# Patient Record
Sex: MALE | Race: BLACK OR AFRICAN AMERICAN | NOT HISPANIC OR LATINO | Employment: FULL TIME | URBAN - METROPOLITAN AREA
[De-identification: names, ages, dates, MRNs, and addresses within clinical notes are randomized per-mention and may not be internally consistent; named-entity substitution may affect disease eponyms.]

---

## 2023-12-18 ENCOUNTER — LAB (OUTPATIENT)
Dept: LAB | Facility: LAB | Age: 68
End: 2023-12-18
Payer: COMMERCIAL

## 2023-12-18 ENCOUNTER — OFFICE VISIT (OUTPATIENT)
Dept: PRIMARY CARE | Facility: CLINIC | Age: 68
End: 2023-12-18
Payer: COMMERCIAL

## 2023-12-18 VITALS
SYSTOLIC BLOOD PRESSURE: 180 MMHG | BODY MASS INDEX: 35.49 KG/M2 | WEIGHT: 213 LBS | OXYGEN SATURATION: 92 % | DIASTOLIC BLOOD PRESSURE: 114 MMHG | HEART RATE: 78 BPM | HEIGHT: 65 IN

## 2023-12-18 DIAGNOSIS — Z12.5 SCREENING FOR PROSTATE CANCER: ICD-10-CM

## 2023-12-18 DIAGNOSIS — I10 HYPERTENSION, UNSPECIFIED TYPE: Primary | ICD-10-CM

## 2023-12-18 DIAGNOSIS — E55.9 VITAMIN D DEFICIENCY: ICD-10-CM

## 2023-12-18 DIAGNOSIS — K21.9 GASTROESOPHAGEAL REFLUX DISEASE, UNSPECIFIED WHETHER ESOPHAGITIS PRESENT: ICD-10-CM

## 2023-12-18 DIAGNOSIS — R05.8 PRODUCTIVE COUGH: ICD-10-CM

## 2023-12-18 DIAGNOSIS — Z12.11 SCREENING FOR COLON CANCER: ICD-10-CM

## 2023-12-18 DIAGNOSIS — Z00.00 WELLNESS EXAMINATION: ICD-10-CM

## 2023-12-18 DIAGNOSIS — I10 HYPERTENSION, UNSPECIFIED TYPE: ICD-10-CM

## 2023-12-18 PROBLEM — R13.14 PHARYNGOESOPHAGEAL DYSPHAGIA: Status: ACTIVE | Noted: 2023-12-18

## 2023-12-18 PROBLEM — R10.12 LEFT UPPER QUADRANT PAIN: Status: ACTIVE | Noted: 2023-12-18

## 2023-12-18 PROBLEM — J06.9 VIRAL URI WITH COUGH: Status: ACTIVE | Noted: 2023-12-18

## 2023-12-18 PROBLEM — J02.9 PHARYNGITIS: Status: ACTIVE | Noted: 2023-12-18

## 2023-12-18 PROBLEM — A04.8 H. PYLORI INFECTION: Status: ACTIVE | Noted: 2023-12-18

## 2023-12-18 PROBLEM — K29.70 GASTRITIS: Status: ACTIVE | Noted: 2023-12-18

## 2023-12-18 PROBLEM — R76.8 ANA POSITIVE: Status: ACTIVE | Noted: 2023-12-18

## 2023-12-18 PROBLEM — R10.9 ABDOMINAL PAIN: Status: ACTIVE | Noted: 2023-12-18

## 2023-12-18 PROBLEM — R79.9 ABNORMAL BLOOD CHEMISTRY: Status: ACTIVE | Noted: 2023-12-18

## 2023-12-18 PROBLEM — J02.9 SORE THROAT: Status: ACTIVE | Noted: 2023-12-18

## 2023-12-18 PROBLEM — M65.312 TRIGGER THUMB OF LEFT HAND: Status: ACTIVE | Noted: 2023-12-18

## 2023-12-18 PROBLEM — D49.0 CECAL NEOPLASM: Status: ACTIVE | Noted: 2023-12-18

## 2023-12-18 PROBLEM — J31.2 CHRONIC SORE THROAT: Status: ACTIVE | Noted: 2023-12-18

## 2023-12-18 PROBLEM — G47.33 OBSTRUCTIVE SLEEP APNEA OF ADULT: Status: ACTIVE | Noted: 2023-12-18

## 2023-12-18 PROBLEM — M79.643 PAIN IN HAND: Status: ACTIVE | Noted: 2023-12-18

## 2023-12-18 PROBLEM — J01.90 ACUTE SINUSITIS: Status: ACTIVE | Noted: 2023-12-18

## 2023-12-18 PROBLEM — M17.11 OSTEOARTHRITIS OF RIGHT PATELLOFEMORAL JOINT: Status: ACTIVE | Noted: 2023-12-18

## 2023-12-18 PROBLEM — M54.16 LUMBAR RADICULOPATHY: Status: ACTIVE | Noted: 2023-12-18

## 2023-12-18 PROBLEM — J32.9 SINUSITIS: Status: ACTIVE | Noted: 2023-12-18

## 2023-12-18 PROBLEM — M25.40 JOINT EFFUSION: Status: ACTIVE | Noted: 2023-12-18

## 2023-12-18 PROBLEM — M19.90 ARTHRITIS: Status: ACTIVE | Noted: 2023-12-18

## 2023-12-18 PROBLEM — M65.341 TRIGGER RING FINGER OF RIGHT HAND: Status: ACTIVE | Noted: 2023-12-18

## 2023-12-18 PROBLEM — M25.569 KNEE PAIN: Status: ACTIVE | Noted: 2023-12-18

## 2023-12-18 LAB
25(OH)D3 SERPL-MCNC: 24 NG/ML (ref 30–100)
ALBUMIN SERPL BCP-MCNC: 4.6 G/DL (ref 3.4–5)
ALP SERPL-CCNC: 65 U/L (ref 33–136)
ALT SERPL W P-5'-P-CCNC: 20 U/L (ref 10–52)
ANION GAP SERPL CALC-SCNC: 14 MMOL/L (ref 10–20)
AST SERPL W P-5'-P-CCNC: 24 U/L (ref 9–39)
BILIRUB SERPL-MCNC: 0.6 MG/DL (ref 0–1.2)
BUN SERPL-MCNC: 11 MG/DL (ref 6–23)
CALCIUM SERPL-MCNC: 10.2 MG/DL (ref 8.6–10.6)
CHLORIDE SERPL-SCNC: 105 MMOL/L (ref 98–107)
CHOLEST SERPL-MCNC: 171 MG/DL (ref 0–199)
CHOLESTEROL/HDL RATIO: 3.8
CO2 SERPL-SCNC: 28 MMOL/L (ref 21–32)
CREAT SERPL-MCNC: 0.72 MG/DL (ref 0.5–1.3)
ERYTHROCYTE [DISTWIDTH] IN BLOOD BY AUTOMATED COUNT: 14.5 % (ref 11.5–14.5)
GFR SERPL CREATININE-BSD FRML MDRD: >90 ML/MIN/1.73M*2
GLUCOSE SERPL-MCNC: 83 MG/DL (ref 74–99)
HCT VFR BLD AUTO: 48 % (ref 41–52)
HDLC SERPL-MCNC: 45.2 MG/DL
HGB BLD-MCNC: 15.6 G/DL (ref 13.5–17.5)
LDLC SERPL CALC-MCNC: 99 MG/DL
MCH RBC QN AUTO: 27.3 PG (ref 26–34)
MCHC RBC AUTO-ENTMCNC: 32.5 G/DL (ref 32–36)
MCV RBC AUTO: 84 FL (ref 80–100)
NON HDL CHOLESTEROL: 126 MG/DL (ref 0–149)
NRBC BLD-RTO: 0 /100 WBCS (ref 0–0)
PLATELET # BLD AUTO: 177 X10*3/UL (ref 150–450)
POTASSIUM SERPL-SCNC: 4.1 MMOL/L (ref 3.5–5.3)
PROT SERPL-MCNC: 7.9 G/DL (ref 6.4–8.2)
PSA SERPL-MCNC: 0.8 NG/ML
RBC # BLD AUTO: 5.72 X10*6/UL (ref 4.5–5.9)
SODIUM SERPL-SCNC: 143 MMOL/L (ref 136–145)
TRIGL SERPL-MCNC: 136 MG/DL (ref 0–149)
TSH SERPL-ACNC: 1.97 MIU/L (ref 0.44–3.98)
VLDL: 27 MG/DL (ref 0–40)
WBC # BLD AUTO: 4.3 X10*3/UL (ref 4.4–11.3)

## 2023-12-18 PROCEDURE — 3080F DIAST BP >= 90 MM HG: CPT | Performed by: STUDENT IN AN ORGANIZED HEALTH CARE EDUCATION/TRAINING PROGRAM

## 2023-12-18 PROCEDURE — 1159F MED LIST DOCD IN RCRD: CPT | Performed by: STUDENT IN AN ORGANIZED HEALTH CARE EDUCATION/TRAINING PROGRAM

## 2023-12-18 PROCEDURE — 36415 COLL VENOUS BLD VENIPUNCTURE: CPT

## 2023-12-18 PROCEDURE — 84443 ASSAY THYROID STIM HORMONE: CPT

## 2023-12-18 PROCEDURE — 85027 COMPLETE CBC AUTOMATED: CPT

## 2023-12-18 PROCEDURE — 1160F RVW MEDS BY RX/DR IN RCRD: CPT | Performed by: STUDENT IN AN ORGANIZED HEALTH CARE EDUCATION/TRAINING PROGRAM

## 2023-12-18 PROCEDURE — 80061 LIPID PANEL: CPT

## 2023-12-18 PROCEDURE — 84153 ASSAY OF PSA TOTAL: CPT

## 2023-12-18 PROCEDURE — 3077F SYST BP >= 140 MM HG: CPT | Performed by: STUDENT IN AN ORGANIZED HEALTH CARE EDUCATION/TRAINING PROGRAM

## 2023-12-18 PROCEDURE — 1036F TOBACCO NON-USER: CPT | Performed by: STUDENT IN AN ORGANIZED HEALTH CARE EDUCATION/TRAINING PROGRAM

## 2023-12-18 PROCEDURE — 82306 VITAMIN D 25 HYDROXY: CPT

## 2023-12-18 PROCEDURE — 93000 ELECTROCARDIOGRAM COMPLETE: CPT | Performed by: STUDENT IN AN ORGANIZED HEALTH CARE EDUCATION/TRAINING PROGRAM

## 2023-12-18 PROCEDURE — 99214 OFFICE O/P EST MOD 30 MIN: CPT | Performed by: STUDENT IN AN ORGANIZED HEALTH CARE EDUCATION/TRAINING PROGRAM

## 2023-12-18 PROCEDURE — 99397 PER PM REEVAL EST PAT 65+ YR: CPT | Performed by: STUDENT IN AN ORGANIZED HEALTH CARE EDUCATION/TRAINING PROGRAM

## 2023-12-18 PROCEDURE — 80053 COMPREHEN METABOLIC PANEL: CPT

## 2023-12-18 RX ORDER — AMOXICILLIN AND CLAVULANATE POTASSIUM 875; 125 MG/1; MG/1
875 TABLET, FILM COATED ORAL 2 TIMES DAILY
Qty: 20 TABLET | Refills: 0 | Status: SHIPPED | OUTPATIENT
Start: 2023-12-18 | End: 2023-12-28

## 2023-12-18 RX ORDER — PANTOPRAZOLE SODIUM 40 MG/1
40 TABLET, DELAYED RELEASE ORAL DAILY
Qty: 30 TABLET | Refills: 5 | Status: SHIPPED | OUTPATIENT
Start: 2023-12-18 | End: 2024-06-15

## 2023-12-18 RX ORDER — OMEPRAZOLE 40 MG/1
1 CAPSULE, DELAYED RELEASE ORAL DAILY
COMMUNITY
Start: 2022-03-29 | End: 2023-12-18 | Stop reason: ALTCHOICE

## 2023-12-18 RX ORDER — AMLODIPINE AND OLMESARTAN MEDOXOMIL 5; 20 MG/1; MG/1
1 TABLET ORAL DAILY
Qty: 30 TABLET | Refills: 1 | Status: SHIPPED | OUTPATIENT
Start: 2023-12-18 | End: 2024-02-16

## 2023-12-18 ASSESSMENT — PATIENT HEALTH QUESTIONNAIRE - PHQ9
1. LITTLE INTEREST OR PLEASURE IN DOING THINGS: NOT AT ALL
SUM OF ALL RESPONSES TO PHQ9 QUESTIONS 1 AND 2: 0
2. FEELING DOWN, DEPRESSED OR HOPELESS: NOT AT ALL

## 2023-12-18 ASSESSMENT — COLUMBIA-SUICIDE SEVERITY RATING SCALE - C-SSRS
6. HAVE YOU EVER DONE ANYTHING, STARTED TO DO ANYTHING, OR PREPARED TO DO ANYTHING TO END YOUR LIFE?: NO
2. HAVE YOU ACTUALLY HAD ANY THOUGHTS OF KILLING YOURSELF?: NO
1. IN THE PAST MONTH, HAVE YOU WISHED YOU WERE DEAD OR WISHED YOU COULD GO TO SLEEP AND NOT WAKE UP?: NO

## 2023-12-18 ASSESSMENT — ENCOUNTER SYMPTOMS
LOSS OF SENSATION IN FEET: 0
OCCASIONAL FEELINGS OF UNSTEADINESS: 0
DEPRESSION: 0

## 2023-12-18 NOTE — PROGRESS NOTES
HPI: 68-year-old male presenting for follow-up on multiple concerns, CPE.    HTN  Not historically on blood pressure medication.  Not checking at home frequently, states he has checked at the drugstore randomly, systolic usually about 160.  Denies any chest pain or shortness of breath.    GERD  Was on PPI at last visit about 2 years ago, has not been taking for over a year.  Getting sore throats in the morning.    Productive cough  Has been sick for about 2 weeks, has had productive cough for about the last week, lots of green phlegm.  Never smoker.    SocHx:   - Smoking: Never    12 point ROS reviewed and negative other than as stated in HPI      General: Alert, oriented, pleasant, in no acute distress  HEENT:      Head: normocephalic, atraumatic;      eyes: EOMI, no scleral icterus;   Neck: soft, supple, non-tender, no masses appreciated  CV: Heart with regular rate and rhythm, normal S1/S2, no murmurs  Lungs: CTAB without wheezing, rhonchi or rales; good respiratory effort, no increased work of breathing  Abdomen: soft, non-tender, non-distended, no masses appreciated  Extremities: no edema, no cyanosis  Neuro: Cranial nerves grossly intact; alert and oriented, normal gait  Psych: Appropriate mood and affect    #HM  -CBC, CMP, Lipid panel, Vit D, TSH with reflex T4, PSA  -Vaccines:       Flu: declines      Shingrix: Recommended, advised to go to local pharmacy      Pneumococcal: Declines      Tdap: Recommended, advised to go to local pharmacy  -Lung cancer screening with low-dose CT: Never smoker  -Colonoscopy: 2014, 5 year cuco, ordered  -AAA screening: Never smoker    #Hypertension  -EKG without significant findings in office, urine analysis without any protein, asymptomatic  -Will start amlodipine-olmesartan 5-20 mg daily  -BMP in 2 weeks  -Home logs given, goal of 130/80, patient to call if any issues with medication or not meeting goal.    #GERD  -Start pantoprazole 40 mg daily    #Productive cough  - Given  duration, will give Augmentin 875 mg twice daily    F/U 4 weeks, sooner if needed    Chris D'Amico, DO

## 2023-12-19 NOTE — RESULT ENCOUNTER NOTE
Vitamin D borderline low at 24, recommend OTC vitamin D3, 2000 units daily.    Remaining labs essentially unremarkable.

## 2024-01-08 ENCOUNTER — APPOINTMENT (OUTPATIENT)
Dept: PRIMARY CARE | Facility: CLINIC | Age: 69
End: 2024-01-08
Payer: COMMERCIAL

## 2024-09-26 ENCOUNTER — OFFICE VISIT (OUTPATIENT)
Dept: URGENT CARE | Age: 69
End: 2024-09-26
Payer: COMMERCIAL

## 2024-09-26 VITALS
DIASTOLIC BLOOD PRESSURE: 86 MMHG | HEART RATE: 76 BPM | SYSTOLIC BLOOD PRESSURE: 119 MMHG | RESPIRATION RATE: 18 BRPM | BODY MASS INDEX: 31.39 KG/M2 | WEIGHT: 200 LBS | HEIGHT: 67 IN | OXYGEN SATURATION: 96 % | TEMPERATURE: 97.5 F

## 2024-09-26 DIAGNOSIS — R05.9 COUGH, UNSPECIFIED TYPE: ICD-10-CM

## 2024-09-26 DIAGNOSIS — J40 BRONCHITIS: Primary | ICD-10-CM

## 2024-09-26 LAB — POC SARS-COV-2 AG BINAX: NORMAL

## 2024-09-26 RX ORDER — METHYLPREDNISOLONE 4 MG/1
TABLET ORAL
Qty: 21 TABLET | Refills: 0 | Status: SHIPPED | OUTPATIENT
Start: 2024-09-26 | End: 2024-10-03

## 2024-09-26 RX ORDER — AZITHROMYCIN 250 MG/1
TABLET, FILM COATED ORAL
Qty: 6 TABLET | Refills: 0 | Status: SHIPPED | OUTPATIENT
Start: 2024-09-26 | End: 2024-10-01

## 2024-09-26 RX ORDER — ALBUTEROL SULFATE 90 UG/1
2 INHALANT RESPIRATORY (INHALATION) EVERY 4 HOURS PRN
Qty: 8.5 G | Refills: 0 | Status: SHIPPED | OUTPATIENT
Start: 2024-09-26 | End: 2025-09-26

## 2024-09-26 ASSESSMENT — ENCOUNTER SYMPTOMS
SORE THROAT: 1
COUGH: 1

## 2024-09-26 NOTE — PATIENT INSTRUCTIONS
You were seen at Urgent Care today for ongoing cough.  Please treat as discussed. Please take medications as prescribed. Monitor for red flags which we spoke about, If your symptoms change, worsen or become concerning in any way, please go to the emergency room immediately, otherwise you can followup with your PCP in 2-3 days as needed

## 2024-09-26 NOTE — PROGRESS NOTES
"Subjective   Patient ID: Pepito Maxwell Jr. is a 69 y.o. male. They present today with a chief complaint of Cough (Dizzy, sweats).    History of Present Illness  Patient is a 69-year-old male with history of chronic bronchitis who presents to urgent care today with a complaint of cough, congestion and sore throat x 2 weeks.  He is here with his wife who states the patient gets bronchitis almost every year but \"this is a little early for him\".  He endorses a productive cough but sometimes keeps him awake at night.  He denies any chest pain or shortness of breath.  No other complaints or concerns mention at this time.      History provided by:  Patient  Cough  Associated symptoms include a sore throat.       Past Medical History  Allergies as of 09/26/2024    (No Known Allergies)       (Not in a hospital admission)         Past Medical History:   Diagnosis Date    Personal history of other diseases of the digestive system 08/29/2014    History of gastritis       Past Surgical History:   Procedure Laterality Date    OTHER SURGICAL HISTORY  03/29/2022    No history of surgery        reports that he has never smoked. He has never used smokeless tobacco.    Review of Systems  Review of Systems   HENT:  Positive for sore throat.    Respiratory:  Positive for cough.                                   Objective    Vitals:    09/26/24 1743   BP: 119/86   Pulse: 76   Resp: 18   Temp: 36.4 °C (97.5 °F)   SpO2: 96%   Weight: 90.7 kg (200 lb)   Height: 1.702 m (5' 7\")     No LMP for male patient.    Physical Exam  Vitals and nursing note reviewed.   Constitutional:       General: He is not in acute distress.     Appearance: Normal appearance. He is not ill-appearing, toxic-appearing or diaphoretic.   HENT:      Head: Normocephalic and atraumatic.      Nose:      Right Sinus: No maxillary sinus tenderness or frontal sinus tenderness.      Left Sinus: No maxillary sinus tenderness or frontal sinus tenderness.      Mouth/Throat:     "  Mouth: Mucous membranes are moist.      Pharynx: Posterior oropharyngeal erythema and postnasal drip present. No pharyngeal swelling or oropharyngeal exudate.   Eyes:      Extraocular Movements: Extraocular movements intact.      Conjunctiva/sclera: Conjunctivae normal.      Pupils: Pupils are equal, round, and reactive to light.   Cardiovascular:      Rate and Rhythm: Normal rate and regular rhythm.      Pulses: Normal pulses.      Heart sounds: Normal heart sounds.   Pulmonary:      Effort: Pulmonary effort is normal. No respiratory distress.      Breath sounds: No stridor. Wheezing present. No rhonchi or rales.   Chest:      Chest wall: No tenderness.   Musculoskeletal:         General: Normal range of motion.      Cervical back: Normal range of motion and neck supple.   Skin:     General: Skin is warm and dry.      Capillary Refill: Capillary refill takes less than 2 seconds.   Neurological:      General: No focal deficit present.      Mental Status: He is alert and oriented to person, place, and time.   Psychiatric:         Mood and Affect: Mood normal.         Behavior: Behavior normal.         Procedures      Assessment/Plan   Allergies, medications, history, and pertinent labs/EKGs/Imaging reviewed by KATE Soto.     Medical Decision Making  Patient is well appearing, afebrile, non toxic, not hypoxic, and appropriate for outpatient treatment and management at time of evaluation. Patient presents with ongoing cough and sore throat x 2 weeks. Differential includes but not limited to: Bronchitis, COVID, URI, other.  Rapid COVID is negative.  History and physical exam consistent with bronchitis.  Prescription for Medrol Dosepak, albuterol inhaler and azithromycin called into patient's pharmacy use as directed.  Close follow-up with PCP.      Plan: Discussed differential with the patient. Patient voices understanding and is agreeable to close follow-up with their PCP in the next 2-3 days. They  understand they should go to the emergency room immediately for any new, worsening or concerning symptoms. Patient understands return precautions and discharge instructions.      Orders and Diagnoses  Diagnoses and all orders for this visit:  Bronchitis  -     albuterol (ProAir HFA) 90 mcg/actuation inhaler; Inhale 2 puffs every 4 hours if needed for wheezing or shortness of breath.  -     azithromycin (Zithromax) 250 mg tablet; Take 2 tabs (500 mg) by mouth today, than 1 daily for 4 days.  -     methylPREDNISolone (Medrol Dospak) 4 mg tablets; Take as directed on package.  Cough, unspecified type  -     POCT Covid-19 Rapid Antigen      Medical Admin Record      Follow Up Instructions  No follow-ups on file.    Patient disposition: Home    Electronically signed by KATE Soto  6:25 PM

## 2025-02-28 ENCOUNTER — OFFICE VISIT (OUTPATIENT)
Dept: URGENT CARE | Age: 70
End: 2025-02-28
Payer: COMMERCIAL

## 2025-02-28 VITALS
TEMPERATURE: 98.2 F | BODY MASS INDEX: 31.39 KG/M2 | SYSTOLIC BLOOD PRESSURE: 153 MMHG | HEIGHT: 67 IN | DIASTOLIC BLOOD PRESSURE: 100 MMHG | WEIGHT: 200 LBS | HEART RATE: 78 BPM | OXYGEN SATURATION: 94 %

## 2025-02-28 DIAGNOSIS — Z75.8 DOES NOT HAVE PRIMARY CARE PROVIDER: Primary | ICD-10-CM

## 2025-02-28 NOTE — PROGRESS NOTES
"Subjective   Patient ID: Pepito Maxwell Jr. is a 69 y.o. male. They present today with a chief complaint of Other (Pt states x2weeks lft shoulder says has a lump on top of shoulder ).    History of Present Illness  Patient is a very pleasant 69-year-old male with history of gastritis who presents urgent care today with his wife for complaint of lump on his left shoulder.  He states he first noticed it 2 weeks ago.  He denies any trauma or injury.  He states it is not painful, itchy or sore.  He states he has never had anything like this before.  He denies any other symptoms or complaints.      History provided by:  Patient and spouse      Past Medical History  Allergies as of 02/28/2025    (No Known Allergies)       (Not in a hospital admission)         Past Medical History:   Diagnosis Date    Personal history of other diseases of the digestive system 08/29/2014    History of gastritis       Past Surgical History:   Procedure Laterality Date    OTHER SURGICAL HISTORY  03/29/2022    No history of surgery        reports that he has never smoked. He has never used smokeless tobacco.    Review of Systems  Review of Systems   Skin:         Lump on left shoulder                                  Objective    Vitals:    02/28/25 1356   BP: (!) 153/100   Pulse: 78   Temp: 36.8 °C (98.2 °F)   SpO2: 94%   Weight: 90.7 kg (200 lb)   Height: 1.702 m (5' 7\")     No LMP for male patient.    Physical Exam  Vitals and nursing note reviewed.   Constitutional:       General: He is not in acute distress.     Appearance: Normal appearance. He is not ill-appearing, toxic-appearing or diaphoretic.   HENT:      Head: Normocephalic and atraumatic.      Mouth/Throat:      Mouth: Mucous membranes are moist.   Eyes:      Extraocular Movements: Extraocular movements intact.      Conjunctiva/sclera: Conjunctivae normal.      Pupils: Pupils are equal, round, and reactive to light.   Cardiovascular:      Rate and Rhythm: Normal rate and regular " rhythm.      Pulses: Normal pulses.      Heart sounds: Normal heart sounds.   Pulmonary:      Effort: Pulmonary effort is normal. No respiratory distress.      Breath sounds: Normal breath sounds. No stridor. No wheezing, rhonchi or rales.   Chest:      Chest wall: No tenderness.   Musculoskeletal:         General: Normal range of motion.        Arms:       Cervical back: Normal range of motion and neck supple.   Skin:     General: Skin is warm and dry.      Capillary Refill: Capillary refill takes less than 2 seconds.      Findings: Lesion present.   Neurological:      General: No focal deficit present.      Mental Status: He is alert and oriented to person, place, and time.   Psychiatric:         Mood and Affect: Mood normal.         Behavior: Behavior normal.         Procedures      Assessment/Plan   Allergies, medications, history, and pertinent labs/EKGs/Imaging reviewed by KATE Soto.     Medical Decision Making    Patient is well appearing, afebrile, non toxic, not hypoxic, and appropriate for outpatient treatment and management at time of evaluation. Patient presents with lump on left shoulder x 2 weeks.     Differential includes but not limited to: Bursitis, cyst, lipoma, abscess, other    On exam, patient has large lump on anterior left shoulder as depicted above.  No central fluctuance.  No induration.  No ecchymosis.  Mass is soft but nonmovable.  No pain with palpation.  No active drainage.  Patient has full range of motion of the left shoulder.  He denies any numbness, tingling or weakness.  He also denies any injury.  Exam otherwise unremarkable.    Etiology of the mass is unclear.  Low suspicion for abscess given there is no central fluctuance, the skin is normal color and temperature and it is not painful.  Suspect bursitis versus lipoma versus cyst.  Patient states he has a dermatologist who he can follow-up with next week.  Suspect ultrasound will be needed for definitive diagnosis.      Patient's blood pressure is elevated and he states he is out of his blood pressure medication.  His recent PCP retired and he has not found a new one. A referral was placed on his behalf.    Red flags and ER precautions discussed.  Patient voices understanding and is agreeable this plan.  He was discharged in stable condition.  All questions and concerns addressed.       Dictation software was used in the creation of this note which does not evaluate or correct for typographical, spelling, syntax or grammatical errors.    Orders and Diagnoses  Diagnoses and all orders for this visit:  Does not have primary care provider  -     Referral to Primary Care; Future      Medical Admin Record      Follow Up Instructions  No follow-ups on file.    Patient disposition: Home    Electronically signed by KATE Soto  2:10 PM

## 2025-02-28 NOTE — PATIENT INSTRUCTIONS
You were seen at Urgent Care today for a mass on your left shoulder. Please treat as discussed. Monitor for red flags which we spoke about, If your symptoms change, worsen or become concerning in any way, please go to the emergency room immediately, otherwise you can followup with dermatology next week as discussed.  A referral for PCP was placed on your behalf.  Please schedule an appointment with them as soon as possible to start blood pressure management.    During emergency room stay, your blood pressure was found to be elevated.  High blood pressure is not often diagnosed in the ED due to multiple factors contributing to the reading.  Your elevated reading could be due to undiagnosed high blood pressure.  I recommend you get your blood pressure rechecked with your primary care provider this week.  Please call to schedule this.